# Patient Record
Sex: FEMALE | Race: BLACK OR AFRICAN AMERICAN
[De-identification: names, ages, dates, MRNs, and addresses within clinical notes are randomized per-mention and may not be internally consistent; named-entity substitution may affect disease eponyms.]

---

## 2020-08-31 ENCOUNTER — HOSPITAL ENCOUNTER (OUTPATIENT)
Dept: HOSPITAL 62 - LC | Age: 30
Discharge: HOME | End: 2020-08-31
Attending: OBSTETRICS & GYNECOLOGY
Payer: SELF-PAY

## 2020-08-31 DIAGNOSIS — O47.1: Primary | ICD-10-CM

## 2020-08-31 DIAGNOSIS — Z3A.40: ICD-10-CM

## 2020-08-31 LAB
APPEARANCE UR: (no result)
APTT PPP: YELLOW S
BARBITURATES UR QL SCN: NEGATIVE
BILIRUB UR QL STRIP: NEGATIVE
GLUCOSE UR STRIP-MCNC: NEGATIVE MG/DL
KETONES UR STRIP-MCNC: NEGATIVE MG/DL
METHADONE UR QL SCN: NEGATIVE
NITRITE UR QL STRIP: NEGATIVE
PCP UR QL SCN: NEGATIVE
PH UR STRIP: 7 [PH] (ref 5–9)
PROT UR STRIP-MCNC: 30 MG/DL
SP GR UR STRIP: 1.02
URINE AMPHETAMINES SCREEN: NEGATIVE
URINE BENZODIAZEPINES SCREEN: NEGATIVE
URINE COCAINE SCREEN: NEGATIVE
URINE MARIJUANA (THC) SCREEN: NEGATIVE
UROBILINOGEN UR-MCNC: 2 MG/DL (ref ?–2)

## 2020-08-31 PROCEDURE — 59025 FETAL NON-STRESS TEST: CPT

## 2020-08-31 PROCEDURE — 81005 URINALYSIS: CPT

## 2020-08-31 PROCEDURE — 80307 DRUG TEST PRSMV CHEM ANLYZR: CPT

## 2020-08-31 NOTE — NON STRESS TEST REPORT
=================================================================

Non Stress Test

=================================================================

Datetime Report Generated by CPN: 08/31/2020 11:18

   

   

=================================================================

DEMOGRAPHIC

=================================================================

   

EGA NST:  40.0

   

=================================================================

INDICATION

=================================================================

   

Indication for Study (NST) Other:  labor check

   

=================================================================

VITAL SIGNS

=================================================================

   

Temperature - NST:  98.5

Pulse - NST:  63

RESP - NST:  18

NBPSYS NST:  119

NBPDIA NST:  68

   

=================================================================

MONITORING

=================================================================

   

Monitor Explained:  Monitor Explained; Test Explained; Patient

   Verbalized Understanding

Time on Monitor:  08/31/2020 10:12

Time off Monitor:  08/31/2020 11:00

NST Duration:  48

   

=================================================================

NST INTERVENTIONS

=================================================================

   

NST Interventions:  PO Hydration

Physician Notified NST:  N Barreto

BABY A:  [id number]

   

=================================================================

BABY A

=================================================================

   

Fetal Movement :  Present

Contraction Frequency :  irreg

FHR Baseline :  135

Accelerations :  15X15

Decelerations :  None

Variability :  Moderate 6-25bpm

NST Review:  Meets Criteria for Reactive NST

NST Review and Verified By :  TENA Hassan RN

NST Results:  Reactive

   

=================================================================

NST REPORT

=================================================================

   

Report Trigger:  Send Report

## 2020-09-01 ENCOUNTER — HOSPITAL ENCOUNTER (INPATIENT)
Dept: HOSPITAL 62 - LC | Age: 30
LOS: 3 days | Discharge: HOME | End: 2020-09-04
Attending: OBSTETRICS & GYNECOLOGY | Admitting: OBSTETRICS & GYNECOLOGY
Payer: COMMERCIAL

## 2020-09-01 DIAGNOSIS — Z3A.40: ICD-10-CM

## 2020-09-01 LAB
ADD MANUAL DIFF: NO
APPEARANCE UR: (no result)
APTT PPP: YELLOW S
BARBITURATES UR QL SCN: NEGATIVE
BASOPHILS # BLD AUTO: 0 10^3/UL (ref 0–0.2)
BASOPHILS NFR BLD AUTO: 0.1 % (ref 0–2)
BILIRUB UR QL STRIP: NEGATIVE
EOSINOPHIL # BLD AUTO: 0 10^3/UL (ref 0–0.6)
EOSINOPHIL NFR BLD AUTO: 0.1 % (ref 0–6)
ERYTHROCYTE [DISTWIDTH] IN BLOOD BY AUTOMATED COUNT: 13.9 % (ref 11.5–14)
GLUCOSE UR STRIP-MCNC: NEGATIVE MG/DL
HCT VFR BLD CALC: 38.2 % (ref 36–47)
HGB BLD-MCNC: 12.8 G/DL (ref 12–15.5)
KETONES UR STRIP-MCNC: (no result) MG/DL
LYMPHOCYTES # BLD AUTO: 1.4 10^3/UL (ref 0.5–4.7)
LYMPHOCYTES NFR BLD AUTO: 10.1 % (ref 13–45)
MCH RBC QN AUTO: 32.1 PG (ref 27–33.4)
MCHC RBC AUTO-ENTMCNC: 33.5 G/DL (ref 32–36)
MCV RBC AUTO: 96 FL (ref 80–97)
METHADONE UR QL SCN: NEGATIVE
MONOCYTES # BLD AUTO: 0.8 10^3/UL (ref 0.1–1.4)
MONOCYTES NFR BLD AUTO: 5.8 % (ref 3–13)
NEUTROPHILS # BLD AUTO: 11.3 10^3/UL (ref 1.7–8.2)
NEUTS SEG NFR BLD AUTO: 83.9 % (ref 42–78)
NITRITE UR QL STRIP: NEGATIVE
PCP UR QL SCN: NEGATIVE
PH UR STRIP: 7 [PH] (ref 5–9)
PLATELET # BLD: 336 10^3/UL (ref 150–450)
PROT UR STRIP-MCNC: 30 MG/DL
RBC # BLD AUTO: 3.99 10^6/UL (ref 3.72–5.28)
SP GR UR STRIP: 1.02
TOTAL CELLS COUNTED % (AUTO): 100 %
URINE AMPHETAMINES SCREEN: NEGATIVE
URINE BENZODIAZEPINES SCREEN: NEGATIVE
URINE COCAINE SCREEN: NEGATIVE
URINE MARIJUANA (THC) SCREEN: NEGATIVE
UROBILINOGEN UR-MCNC: 2 MG/DL (ref ?–2)
WBC # BLD AUTO: 13.4 10^3/UL (ref 4–10.5)

## 2020-09-01 PROCEDURE — 90715 TDAP VACCINE 7 YRS/> IM: CPT

## 2020-09-01 PROCEDURE — 85027 COMPLETE CBC AUTOMATED: CPT

## 2020-09-01 PROCEDURE — 86901 BLOOD TYPING SEROLOGIC RH(D): CPT

## 2020-09-01 PROCEDURE — 80307 DRUG TEST PRSMV CHEM ANLYZR: CPT

## 2020-09-01 PROCEDURE — 86592 SYPHILIS TEST NON-TREP QUAL: CPT

## 2020-09-01 PROCEDURE — 85025 COMPLETE CBC W/AUTO DIFF WBC: CPT

## 2020-09-01 PROCEDURE — 81005 URINALYSIS: CPT

## 2020-09-01 PROCEDURE — 86850 RBC ANTIBODY SCREEN: CPT

## 2020-09-01 PROCEDURE — 86900 BLOOD TYPING SEROLOGIC ABO: CPT

## 2020-09-01 PROCEDURE — 36415 COLL VENOUS BLD VENIPUNCTURE: CPT

## 2020-09-01 PROCEDURE — 86695 HERPES SIMPLEX TYPE 1 TEST: CPT

## 2020-09-01 PROCEDURE — 84112 EVAL AMNIOTIC FLUID PROTEIN: CPT

## 2020-09-01 PROCEDURE — 86696 HERPES SIMPLEX TYPE 2 TEST: CPT

## 2020-09-01 RX ADMIN — PENICILLIN G POTASSIUM SCH MLS/HR: 5000000 POWDER, FOR SOLUTION INTRAMUSCULAR; INTRAPLEURAL; INTRATHECAL; INTRAVENOUS at 20:23

## 2020-09-01 RX ADMIN — PENICILLIN G POTASSIUM SCH MLS/HR: 5000000 POWDER, FOR SOLUTION INTRAMUSCULAR; INTRAPLEURAL; INTRATHECAL; INTRAVENOUS at 15:59

## 2020-09-01 NOTE — L&D PROGRESS NOTES
=================================================================

PROGRESS NOTES

=================================================================

Datetime Report Generated by CPN: 09/01/2020 17:02

   

   

=================================================================

PROGRESS NOTE

=================================================================

   

Impression:  Reassuring Fetal Heart Rate

Comment:  Irregular uc's, Cat 1 strip

   

=================================================================

LAST VAGINAL EXAM-NURSING

=================================================================

   

Nursing Exam Dilitation:  3.0

Nursing Exam Effacement:  100

Nursing Exam Station:  -2

Nursing Exam Contractions:  talking through contractions

   

=================================================================

FETUS A

=================================================================

   

Monitoring:  External US

Fetal Presentation:  Vertex

   

=================================================================

SIGNATURE

=================================================================

   

SIGNATURE:  [id number]

Assignment:  Mandie Antonio MD

Signature:  Electronically signed by Arianna Malave CNM on 9/1/2020 at

   17:02  with User ID: [email or web address]

:  Electronically signed by Arianna Malave CNM on 9/1/2020 at 17:02  with

   User ID: [email or web address]

## 2020-09-01 NOTE — ADMISSION PHYSICAL
=================================================================



=================================================================

Datetime Report Generated by CPN: 2020 11:40

   

   

=================================================================

CURRENT ADMISSION

=================================================================

   

Prenatal Hx Assessment:  The Prenatal History has been Reviewed and is

   Current

Chief Complaint:  Uterine Contractions

Indication for Induction:  Not Applicable

Admit Impression :  Term, Intrauterine Pregnancy; No Active Labor;

   Ruptured Membranes

Admit Plan:  Admit to Unit; Initiate Labor Augmentation Protocol

   

=================================================================

ALLERGIES

=================================================================

   

Medication Allergies:  No

Medication Allergies:  No Known Allergies (2020)

Latex:  No Latex Allergies

   

=================================================================

OBSTETRICAL HISTORY

=================================================================

   

EDC:  2020 00:00

:  1

Para:  0

Term:  0

:  0

SAB:  0

IAB:  0

Livin

Gestational Diabetes:  Yes

Rh Sensitization:  No

Incompetent Cervix:  No

RAY:  No

Infertility:  No

ART Treatment:  No

Uterine Anomaly:  No

IUGR:  No

Hx Previous C/S:  No

Macrosomia:  No

Hx Loss/Stillborn:  No

PIH:  No

Hx  Death:  No

Placenta Previa/Abruption:  No

Depression/PP Depression:  No

PTL/PROM:  No

Post Partum Hemorrhage:  No

Current Pregnancy Procedures:  Ultrasound; NST

   

=================================================================

***SEE PRENATAL RECORDS***

=================================================================

   

Alcohol:  No

Marijuana :  No

Cocaine:  No

Other Illicit Drugs:  No

Cigarettes:  Never Smoker. [id number]

   

=================================================================

MEDICAL HISTORY

=================================================================

   

Diabetes:  Yes

Diabetes Type:  Gestational Diabetes

Blood Transfusion:  No

Pulmonary Disease (Asthma, TB):  No

Breast Disease:  No

Hypertension:  Unknown

Gyn Surgery:  No

Heart Disease:  No

Hosp/Surgery:  No

Autoimmune Disorder:  No

Anesthetic Complications:  No

Kidney Disease:  No

Abnormal Pap Smear:  No

Neuro/Epilepsy:  No

Psychiatric Disorders:  No

Other Medical Diseases:  No

Hepatitis/Liver Disease:  No

Significant Family History:  No

Varicosities/Phlebitis:  No

Trauma/Violence :  Unknown

Thyroid Dysfunction:  No

   

=================================================================

INFECTIOUS HISTORY

=================================================================

   

Gonorrhea:  No

Genital Herpes:  Yes

Chlamydia:  No

Tuberculosis:  No

Syphilis:  No

Hepatitis:  No

HIV/AIDS Exposure:  No

Rash or Viral Illness:  No

HPV:  No

   

=================================================================

PHYSICAL EXAM

=================================================================

   

General:  Normal

HEENT:  Normal

Neurologic:  Normal

Thyroid:  Normal

Heart:  Normal

Lungs:  Normal

Breast:  Deferred

Back:  Normal

Abdomen:  Normal

Genitourinary Exam:  Normal

Extremities:  Normal

DTRs:  Normal

Pelvic Type:  Adequate

Physical Exam Comments:  IUP 40 weeks

   + GBS

   Cushing Diagnosis? sent to Endo

   GDM

   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

   Obesity

   

=================================================================

FETUS A

=================================================================

   

EGA:  40.1

Monitoring:  External US

Variability:  Moderate 6-25bpm

Decelerations:  None

FHR Category:  Category I

Fetal Presentation:  Vertex

Admit Comment:  Admitted to LD with complaint of UC's and leaking

   fluid, Cat 1 stip,, UC's q 2-4

   POC discussed with patient, + GBS, will start antibiotics

   

=================================================================

PLANS FOR LABOR AND DELIVERY

=================================================================

   

Labor and Delivery:  None

Pain Management:  None

Feeding Preference:  Breast

Benefit of Breast Feed Discussed:  Yes

   

=================================================================

INFORMED CONSENT

=================================================================

   

Assignment:  Mandie Antonio, MD

Signature:  Electronically signed by Arianna Malave CNM on 2020 at

   11:39  with User ID: [email or web address]

:  Electronically signed by Arianna Malave CNM on 2020 at 11:39  with

   User ID: [email or web address]

## 2020-09-02 RX ADMIN — Medication SCH CAP: at 09:53

## 2020-09-02 RX ADMIN — FERROUS SULFATE TAB 325 MG (65 MG ELEMENTAL FE) SCH MG: 325 (65 FE) TAB at 09:53

## 2020-09-02 RX ADMIN — IBUPROFEN SCH: 800 TABLET, FILM COATED ORAL at 16:03

## 2020-09-02 RX ADMIN — FAMOTIDINE SCH MG: 20 TABLET, FILM COATED ORAL at 09:53

## 2020-09-02 RX ADMIN — IBUPROFEN SCH MG: 800 TABLET, FILM COATED ORAL at 21:23

## 2020-09-02 RX ADMIN — FAMOTIDINE SCH MG: 20 TABLET, FILM COATED ORAL at 21:23

## 2020-09-02 RX ADMIN — DOCUSATE SODIUM SCH MG: 100 CAPSULE, LIQUID FILLED ORAL at 09:53

## 2020-09-02 RX ADMIN — IBUPROFEN SCH MG: 800 TABLET, FILM COATED ORAL at 14:04

## 2020-09-02 RX ADMIN — DOCUSATE SODIUM SCH MG: 100 CAPSULE, LIQUID FILLED ORAL at 17:47

## 2020-09-02 RX ADMIN — SENNOSIDES, DOCUSATE SODIUM SCH EACH: 50; 8.6 TABLET, FILM COATED ORAL at 09:53

## 2020-09-02 RX ADMIN — FERROUS SULFATE TAB 325 MG (65 MG ELEMENTAL FE) SCH MG: 325 (65 FE) TAB at 17:47

## 2020-09-02 NOTE — PDOC PROGRESS REPORT
Subjective-OB


Progress Note for:: 20


Subjective: 





29yo G1 now P1 s/p  delivery day. Pt ambulating and voiding without 

difficulty, reports pain well controlled by medication no concerns today.





Physical Exam (OB)


Vital Signs: 


                                        











Temp Pulse Resp BP Pulse Ox


 


 98.0 F   69   16   136/76 H   


 


 20 09:05  20 09:05  20 09:05  20 09:05   








                                 Intake & Output











 20





 06:59 06:59 06:59


 


Weight  111.7 kg 














- General


General Appearance: Appears well


In distress: None





- Maternal Morbidity


59. Maternal Morbidity (serious complications experinced by the mother 

associated with labor and delivery: None of the above





- Episiotomy/Laceration


Site Condition: N/A





- Lochia


Lochia Amount: Small 10-25 ml





- Abdomen


Description: Soft


Fundal Description: Firm, Midline


Fundal Height: u/u - u/2





- Respiratory


Respiratory Status: No respiratory distress





- Extremities


Upper extremity: Normal inspection


Lower extremities: Normal inspection





- Neurological


Cognition: Normal


Orientation: AAOx4





- Psychological


Associated symptoms: Normal affect, Normal mood





Objective-Diagnostic


Laboratory: 


                                        





                                 20 11:25 





                                        











  20





  09:55 11:25 11:25


 


WBC   13.4 H 


 


RBC   3.99 


 


Hgb   12.8 


 


Hct   38.2 


 


MCV   96 


 


MCH   32.1 


 


MCHC   33.5 


 


RDW   13.9 


 


Plt Count   336 


 


Seg Neutrophils %   83.9 H 


 


Urine Color  YELLOW  


 


Urine Appearance  CLOUDY  


 


Urine pH  7.0  


 


Ur Specific Gravity  1.017  


 


Urine Protein  30 H  


 


Urine Glucose (UA)  NEGATIVE  


 


Urine Ketones  TRACE H  


 


Urine Blood  LARGE H  


 


Urine Nitrite  NEGATIVE  


 


Ur Leukocyte Esterase  MODERATE H  


 


Blood Type    O POSITIVE


 


Antibody Screen    NEGATIVE














Assessment and Plan(PN)





- Assessment and Plan


(1) GBS (group B Streptococcus carrier), +RV culture, currently pregnant


Is this a current diagnosis for this admission?: Yes   


Plan: 


 delivered 








(2) Vaginal delivery


Is this a current diagnosis for this admission?: Yes   


Plan: 


routine pp care








- Time Spent with Patient


Time with patient: Less than 15 minutes


Medications reviewed and adjusted accordingly: Yes





- Disposition


Anticipated Discharge Disposition: Home, Self Care


Anticipated Discharge Timeframe: within 48 hours

## 2020-09-02 NOTE — BIRTH CERTIFICATE DATA
=================================================================

Birth Cert Data

=================================================================

Datetime Report Generated by CPN: 2020 02:40

   

   

=================================================================

BIRTH CERTIFICATE DATA

=================================================================

   

 47a. Prenatal Care:  Yes    (2020 10:00:Angela Cano RN)

 47b. Date of First Visit:  2020 00:00    (2020

   10:00:Angela Cano RN)

 47c. Date of Last Visit:  2020 00:00    (2020 10:00:Angela Cano RN)

 47d. Number of Prenatal Visits:  16    (2020 10:00:Angela Cano RN)

 48a. Number of Prev Live Births:  0    (2020 10:00:Karena Garcia RN)

 48b. Now Livin    (2020 10:00:Laure Duenas RN)

 48c. Live Births Now Dead:  0    (2020 10:00:QS system process)

 48e. Pregnancy Losses:  0    (2020 10:00:Angela Cano RN)

   

=================================================================

RISK FACTORS IN THIS PREGNANCY

=================================================================

   

 49a. Diabetes:  Yes    (2020 10:00:Angela Cano RN)

 Type of Diabetes:  Gestational Diabetes    (2020 10:00:Angela Cano RN)

 49b. Hypertension:  Unknown    (2020 10:00:Angela Cano RN)

 49c. Previous  Births:  0    (2020 10:00:Laure Duenas RN)

 49d. Stillborns:  No    (2020 10:00:Angela Cano RN)

 49d. IUGR:  No    (2020 10:00:Angela Cano RN)

 49e. Infertility Treatment:  No    (2020 10:00:Angela Cano RN)

 49f. Previous Cesareans:  0    (2020 10:00:Angela Cano RN)

   

=================================================================

Mother's Height

=================================================================

   

 50b. Height Inches:  64    (2020 11:13:QS system process)

   

=================================================================

Mother's Weight 

=================================================================

   

 51a. Pre-Pregnancy Weight (lbs):  211    (2020 10:00:Angela Cano RN)

 51b. Weight at Delivery (lbs):  246    (2020 11:13:QS system

   process)

   

=================================================================

Infections Present/Treated

=================================================================

   

 53a. Gonorrhea:  No    (2020 10:00:Angela Cano RN)

 Results this Hospital Visit :  Negative    (2020 10:00:April

   BYRON Duenas)

 53b. Syphilis:  No    (2020 10:00:Angela Cano RN)

 53c. Chlamydia:  No    (2020 10:00:Angela Cano RN)

 Results this Hospital Visit:  Negative    (2020 10:00:April

   BYRON Duenas)

 53d. Hepatitis B:  No    (2020 10:00:Angela Cano RN)

 Results this Hospital Visit:  Negative    (2020 10:00:April

   BYRON Duenas)

 53e. Hepatitis C:  Negative    (2020 10:00:Angela Cano RN)

 53h. Mother Tested for HBsAG:  Yes    (2020 10:00:Angela Cano RN)

 53i. Date Tested:  2020 00:00    (2020 10:00:Angela Cano RN)

 53j. Test Result:  Negative    (2020 10:00:Laure DuenasBYRON)

   

=================================================================

Obstetric Procedures 

=================================================================

   

 54a, b, c. Obstetric Procedures:  Ultrasound; NST    (2020

   10:00:Angela Cano RN)

   

=================================================================

Onset of Labor

=================================================================

   

 56a. PROM >12 Hrs:  17.47    (2020 11:30:QS system process)

 56b. Precipitous Labor <3 Hrs:  9    (2020 10:00:QS system

   process)

 56c. Prolonged Labor > 20 Hrs:  9    (2020 10:00:QS system

   process)

   

=================================================================



=================================================================

   

 57a. Induction of Labor:  N/A    (2020 10:00:Karena Garcia RN)

 57c. Non-Vertex Presentation A:  Vertex    (2020 10:00:Karena Garcia RN)

 57d. Steroids - Fetal Lung Mat:  None    (2020 10:00:Karena Garcia RN)

 57d. Steroids - Fetal Lung Mat:  Not Applicable    (2020

   10:00:Karena Garcia RN)

 57e. Antibiotics During Labor:  2020 20:23    (2020

   10:00:Karena Garcia RN)

 57f. Mat Chorio or Temp >100.4:  99.2    (2020 10:00:Karena Garcia RN)

 57g. Moderate/Heavy Meconium:  Clear    (2020 11:30:Angela Cano RN)

 57i. Epidural/Spinal Anesthesia:  Epidural    (2020 10:00:Karena Garcia RN)

   

=================================================================

Method of Delivery

=================================================================

   

 58a. Forceps - Unsuccessful A:  N/A    (2020 10:00:Karena Garcia RN)

 58b. Vacuum - Unsuccessful A:  N/A    (2020 10:00:Karena Garcia RN)

   

=================================================================

58c. Presentation at Birth

=================================================================

   

 58c. Presentation at Birth - A :  Vertex    (2020 10:00:Karena Garcia RN)

 58c. Presentation at Birth - A :  N/A    (2020 10:00:Karena Garcia RN)

 58c. Presentation at Birth - A :  Cephalic    (2020 22:03:Karena Garcia RN)

   

=================================================================

Final Route and Method of Del 

=================================================================

   

 58d. Baby A Route/Delivery:  Vaginal    (2020 10:00:Karena Garcia RN)

 58e. Trial of Labor Attempted:  No    (2020 10:00:Karena Garcia RN)

 58e. Trial of Labor Attempted A:  N/A    (2020 10:00:Karena Garcia RN)

 58e. Trial of Labor Attempted B:  N/A    (2020 10:00:Karena Garcia RN)

   

=================================================================

Maternal Morbidity

=================================================================

   

 59b. 3rd or 4th Degree Lacs:  None    (2020 10:00:Arianna Malave, CNM)

   

=================================================================



=================================================================

   

 Birthweight Baby A:  2920    (2020 10:00:Niya Darnell RN)

 60a. Pounds :  6    (2020 10:00:QS system process)

 60b. Ounces:  7    (2020 10:00:QS system process)

   

=================================================================

61. GA at Delivery 

=================================================================

   

 Baby A:  40.1    (2020 10:00:Karena Garcia RN)

:  Full Term- 39- 40.6 Weeks    (2020 10:00:QS system process)

   

=================================================================

62a. APGAR 5 Minute

=================================================================

   

 Baby A:  9    (2020 10:00:QS system process)

## 2020-09-02 NOTE — DELIVERY SUMMARY
=================================================================

Del Sum A-C

=================================================================

Datetime Report Generated by CPN: 2020 02:40

   

   

=================================================================

DELIVERY PERSONNEL

=================================================================

   

DELIVERY PERSONNEL:  [id number]

Delivery Doctor::  Arianna Malave CNM

Labor and Delivery Nurse::  Karena Garcia RN

Nursery Nurse::  Laure Jolly RN

   

=================================================================

MATERNAL INFORMATION

=================================================================

   

Delivery Anesthesia:  Epidural

Medications After Delivery:  Pitocin 30 Units in 500ml NS/D5W

Delivery QBL:  100

Maternal Complications:  None

Provider Comments:   viable femal from OA to JOHN PAUL over intact

   perineum, baby placed on mothers abd, cord clamped and cut after 2

   min by FOB, spont delivery of grossly normal intact placenta, 3 VC,

   cord blood to lab, FFFM, massage and Pitocin, baby and mom remain in

   recovery in stable condition, plans to breastfeed (Annotations: Data

   stored by [facility] on behalf of user)

   

=================================================================

LABOR SUMMARY

=================================================================

   

EDC:  2020 00:00

No. Babies in Womb:  1

 Attempted:  No

Labor Anesthesia:  Epidural

   

=================================================================

LABOR INFORMATION

=================================================================

   

Reason for Induction:  Not Applicable

Onset of Labor:  2020 16:00

Complete Dilatation:  2020 23:24

Oxytocin:  N/A

Group B Beta Strep:  Positive

Antibiotics # of Doses:  3

Antibiotics Time of Last Dose:  2020 20:23

Name of Antibiotic Given:  Penicilin

Steroids Given:  None

Reason Steroids Not Administered:  Not Applicable

   

=================================================================

MEMBRANES

=================================================================

   

Membranes Rupture Method:  Spontaneous

Rupture of Membranes:  2020 07:30

Length of Rupture (hr):  17.47

Amniotic Fluid Color:  Clear

Amniotic Fluid Amount:  Small

Amniotic Fluid Odor:  Normal

   

=================================================================

STAGES OF LABOR

=================================================================

   

Stage 1 hr:  7

Stage 1 min:  24

Stage 2 hr:  1

Stage 2 min:  34

Stage 3 hr:  0

Stage 3 min:  11

Total Time in Labor hr:  9

Total Time in Labor min:  9

   

=================================================================

VAGINAL DELIVERY

=================================================================

   

Episiotomy:  None

Laceration #1:  None

Laceration Extension #1:  N/A

Laceration Repair:  Not Applicable

Sponge Count Correct:  Vaginal Sweep Performed

Sharps Count Correct:  N/A

   

=================================================================

BABY A INFORMATION

=================================================================

   

Infant Delivery Date/Time:  2020 00:58

Method of Delivery:  Vaginal

Nurse Controlled Delivery:  No

Born in Route :  No

:  N/A

Forceps:  N/A

Vacuum Extraction:  N/A

Shoulder Dystocia :  No

   

=================================================================

PRESENTATION/POSITION BABY A

=================================================================

   

Presentation:  Cephalic

Cephalic Presentation:  Vertex

Vertex Position:  Left Occipital Anterior

Breech Presentation:  N/A

   

=================================================================

PLACENTA INFORMATION BABY A

=================================================================

   

Placenta Delivery Time :  2020 01:09

Placenta Method of Delivery:  Spontaneous

Placenta Status:  Delivered

   

=================================================================

APGAR SCORES BABY A

=================================================================

   

Heart Rate 1 min:  >100 bpm

Resp Effort 1 min:  Good Cry

Reflex Irritability 1 min:  Cough or Sneeze or Pulls Away

Muscle Tone 1 min:  Active Motion

Color 1 min:  Blue/Pale

Resuscitation Effort 1 min:  Tactile Stimulation

APGAR SCORE 1 MIN:  8

Heart Rate 5 min:  >100 bpm

Resp Effort 5 min:  Good Cry

Reflex Irritability 5 min:  Cough or Sneeze or Pulls Away

Muscle Tone 5 min:  Active Motion

Color 5 min:  Body Pink, Extremities Blue

Resuscitation Effort 5 min:  Tactile Stimulation

APGAR SCORE 5 MIN:  9

   

=================================================================

INFANT INFORMATION BABY A

=================================================================

   

Gestational Age at Delivery:  40.1

Gestational Status:  Full Term- 39- 40.6 Weeks

Infant Outcome :  Liveborn

Infant Condition :  Stable

Infant Sex:  Female

   

=================================================================

IDENTIFICATION BABY A

=================================================================

   

Infant Verification Date/Time:  2020 01:37

ID Band Number:  [id number]

Mother's Name Verified:  Yes

Infant Medical Record Number:  [id number]

RN Verifying Infant:  SHARA Garcia, RN/KRadha Segura, RN

   

=================================================================

WEIGHT/LENGTH BABY A

=================================================================

   

Infant Birthweight (gm):  2920

Infant Weight (lb):  6

Infant Weight (oz):  7

Infant Length (in):  19.75

Infant Length (cm):  50.17

   

=================================================================

CORD INFORMATION BABY A

=================================================================

   

No. Cord Vessels:  3

Nuchal Cord :  N/A

Cord Blood Taken:  Yes-For Eval (Mom's Blood Type - or O+)

Infant Suction:  None

   

=================================================================

ASSESSMENT BABY A

=================================================================

   

Infant Complications:  None

Physical Findings at Delivery:  Within Normal Limits

Infant Respirations:  Appears Normal

Skin to Skin:  Yes

Neonatologist/ALS Called :  No

Infant Care By:  MO Orta, RN

Transferred To:  Remains with Mother

   

=================================================================

BABY B INFORMATION

=================================================================

   

 :  N/A

## 2020-09-03 LAB
ERYTHROCYTE [DISTWIDTH] IN BLOOD BY AUTOMATED COUNT: 14.3 % (ref 11.5–14)
HCT VFR BLD CALC: 36.6 % (ref 36–47)
HGB BLD-MCNC: 12.4 G/DL (ref 12–15.5)
MCH RBC QN AUTO: 32.7 PG (ref 27–33.4)
MCHC RBC AUTO-ENTMCNC: 33.8 G/DL (ref 32–36)
MCV RBC AUTO: 97 FL (ref 80–97)
PLATELET # BLD: 335 10^3/UL (ref 150–450)
RBC # BLD AUTO: 3.79 10^6/UL (ref 3.72–5.28)
WBC # BLD AUTO: 14.3 10^3/UL (ref 4–10.5)

## 2020-09-03 RX ADMIN — FERROUS SULFATE TAB 325 MG (65 MG ELEMENTAL FE) SCH MG: 325 (65 FE) TAB at 19:43

## 2020-09-03 RX ADMIN — IBUPROFEN SCH MG: 800 TABLET, FILM COATED ORAL at 13:45

## 2020-09-03 RX ADMIN — IBUPROFEN SCH MG: 800 TABLET, FILM COATED ORAL at 21:30

## 2020-09-03 RX ADMIN — Medication SCH CAP: at 11:33

## 2020-09-03 RX ADMIN — IBUPROFEN SCH MG: 800 TABLET, FILM COATED ORAL at 05:41

## 2020-09-03 RX ADMIN — FAMOTIDINE SCH MG: 20 TABLET, FILM COATED ORAL at 11:33

## 2020-09-03 RX ADMIN — FERROUS SULFATE TAB 325 MG (65 MG ELEMENTAL FE) SCH MG: 325 (65 FE) TAB at 11:33

## 2020-09-03 RX ADMIN — SENNOSIDES, DOCUSATE SODIUM SCH EACH: 50; 8.6 TABLET, FILM COATED ORAL at 11:33

## 2020-09-03 RX ADMIN — DOCUSATE SODIUM SCH MG: 100 CAPSULE, LIQUID FILLED ORAL at 19:42

## 2020-09-03 RX ADMIN — FAMOTIDINE SCH MG: 20 TABLET, FILM COATED ORAL at 21:31

## 2020-09-03 RX ADMIN — DOCUSATE SODIUM SCH MG: 100 CAPSULE, LIQUID FILLED ORAL at 11:33

## 2020-09-03 NOTE — PDOC PROGRESS REPORT
Subjective-OB


Progress Note for:: 09/03/20





Physical Exam (OB)


Vital Signs: 


                                        











Temp Pulse Resp BP Pulse Ox


 


 97.7 F   67   16   128/84 H  100 


 


 09/03/20 07:53  09/03/20 07:53  09/03/20 07:53  09/03/20 07:53  09/02/20 20:01








                                 Intake & Output











 09/02/20 09/03/20 09/04/20





 06:59 06:59 06:59


 


Intake Total  480 


 


Balance  480 


 


Weight 111.7 kg  














- PIH/Pre-Eclampsia


Clonus: Negative


Headache: Absent


Epigastric Pain: No


Visual Changes: No





- Maternal Morbidity


59. Maternal Morbidity (serious complications experinced by the mother 

associated with labor and delivery: None of the above





- Lochia


Lochia Amount: Small 10-25 ml


Lochia Color: Rubra/Red





- Abdomen


Description: Tender, Soft, Flat


Hernia Present: No


Bowel Sounds: Normoactive


Flatus Presence: Present


Stool: No


Fundal Description: Firm, Midline


Fundal Height: u/u - u/2





Objective-Diagnostic


Laboratory: 


                                        





                                 09/03/20 08:10 





                                        











  09/03/20





  08:10


 


WBC  14.3 H


 


RBC  3.79


 


Hgb  12.4


 


Hct  36.6


 


MCV  97


 


MCH  32.7


 


MCHC  33.8


 


RDW  14.3 H


 


Plt Count  335














Assessment and Plan(PN)





- Time Spent with Patient


Time with patient: Less than 15 minutes


Medications reviewed and adjusted accordingly: Yes





- Disposition


Anticipated Discharge Disposition: Home, Self Care


Anticipated Discharge Timeframe: within 36 hours

## 2020-09-04 VITALS — DIASTOLIC BLOOD PRESSURE: 85 MMHG | SYSTOLIC BLOOD PRESSURE: 129 MMHG

## 2020-09-04 RX ADMIN — IBUPROFEN SCH MG: 800 TABLET, FILM COATED ORAL at 06:45

## 2020-09-04 RX ADMIN — FERROUS SULFATE TAB 325 MG (65 MG ELEMENTAL FE) SCH MG: 325 (65 FE) TAB at 09:52

## 2020-09-04 RX ADMIN — SENNOSIDES, DOCUSATE SODIUM SCH EACH: 50; 8.6 TABLET, FILM COATED ORAL at 09:52

## 2020-09-04 RX ADMIN — FAMOTIDINE SCH MG: 20 TABLET, FILM COATED ORAL at 09:52

## 2020-09-04 RX ADMIN — IBUPROFEN SCH: 800 TABLET, FILM COATED ORAL at 15:31

## 2020-09-04 RX ADMIN — Medication SCH CAP: at 09:52

## 2020-09-04 RX ADMIN — DOCUSATE SODIUM SCH MG: 100 CAPSULE, LIQUID FILLED ORAL at 09:52

## 2020-09-04 NOTE — PDOC DISCHARGE SUMMARY
Impression





- Admit/DC Date/PCP


Admission Date/Primary Care Provider: 


  09/01/20 11:06





  CARMEN MORATAYA MD





Discharge Date: 09/04/20 - PP day #2. doing well, O+ Rubella immune





- Discharge Diagnosis


(1) GBS (group B Streptococcus carrier), +RV culture, currently pregnant


Is this a current diagnosis for this admission?: Yes   





(2) Herpes


Is this a current diagnosis for this admission?: Yes   





(3) Pregnancy


Is this a current diagnosis for this admission?: Yes   





(4) Vaginal delivery


Is this a current diagnosis for this admission?: Yes   





- Additional Information


Resuscitation Status: Full Code


Discharge Diet: As Tolerated, Regular


Discharge Activity: Activity As Tolerated, No Lifting Over 10 Pounds, Pelvic 

Rest


Referrals: 


CARMEN MORATAYA MD [Primary Care Provider] - 


Prescriptions: 


Ibuprofen [Motrin 800 mg Tablet] 800 mg PO Q8 #60 tablet


Home Medications: 








Pnv No.95/Ferrous Fum/Folic AC [Prenatal Caplet] 1 each PO DAILY 08/31/20 


Valacyclovir HCl [Valtrex 500 mg Tablet] 500 mg PO BID 08/31/20 


Ibuprofen [Motrin 800 mg Tablet] 800 mg PO Q8 #60 tablet 09/04/20 











HPI


Reason(s) for Admission: Onset of Labor


Prenatal Procedures: Ultrasound


Intrapartum Procedure(s): Spontaneous Vaginal Delivery





Hospital Course


59. Maternal Morbidity (serious complications experinced by the mother 

associated with labor and delivery: None of the above





Results


Laboratory Results: 


                                        











WBC  14.3 10^3/uL (4.0-10.5)  H  09/03/20  08:10    


 


RBC  3.79 10^6/uL (3.72-5.28)   09/03/20  08:10    


 


Hgb  12.4 g/dL (12.0-15.5)   09/03/20  08:10    


 


Hct  36.6 % (36.0-47.0)   09/03/20  08:10    


 


MCV  97 fl (80-97)   09/03/20  08:10    


 


MCH  32.7 pg (27.0-33.4)   09/03/20  08:10    


 


MCHC  33.8 g/dL (32.0-36.0)   09/03/20  08:10    


 


RDW  14.3 % (11.5-14.0)  H  09/03/20  08:10    


 


Plt Count  335 10^3/uL (150-450)   09/03/20  08:10    


 


Lymph % (Auto)  10.1 % (13-45)  L  09/01/20  11:25    


 


Mono % (Auto)  5.8 % (3-13)   09/01/20  11:25    


 


Eos % (Auto)  0.1 % (0-6)   09/01/20  11:25    


 


Baso % (Auto)  0.1 % (0-2)   09/01/20  11:25    


 


Absolute Neuts (auto)  11.3 10^3/uL (1.7-8.2)  H  09/01/20  11:25    


 


Absolute Lymphs (auto)  1.4 10^3/uL (0.5-4.7)   09/01/20  11:25    


 


Absolute Monos (auto)  0.8 10^3/uL (0.1-1.4)   09/01/20  11:25    


 


Absolute Eos (auto)  0.0 10^3/uL (0.0-0.6)   09/01/20  11:25    


 


Absolute Basos (auto)  0.0 10^3/uL (0.0-0.2)   09/01/20  11:25    


 


Seg Neutrophils %  83.9 % (42-78)  H  09/01/20  11:25    


 


Urine Color  YELLOW   09/01/20  09:55    


 


Urine Appearance  CLOUDY   09/01/20  09:55    


 


Urine pH  7.0  (5.0-9.0)   09/01/20  09:55    


 


Ur Specific Gravity  1.017   09/01/20  09:55    


 


Urine Protein  30 mg/dL (NEGATIVE)  H  09/01/20  09:55    


 


Urine Glucose (UA)  NEGATIVE mg/dL (NEGATIVE)   09/01/20  09:55    


 


Urine Ketones  TRACE mg/dL (NEGATIVE)  H  09/01/20  09:55    


 


Urine Blood  LARGE  (NEGATIVE)  H  09/01/20  09:55    


 


Urine Nitrite  NEGATIVE  (NEGATIVE)   09/01/20  09:55    


 


Urine Bilirubin  NEGATIVE  (NEGATIVE)   09/01/20  09:55    


 


Urine Urobilinogen  2.0 mg/dL (<2.0)  H  09/01/20  09:55    


 


Ur Leukocyte Esterase  MODERATE  (NEGATIVE)  H  09/01/20  09:55    


 


Urine Ascorbic Acid  NEGATIVE  (NEGATIVE)   09/01/20  09:55    


 


Fetal Membranes Rupture  POSITIVE  (NEGATIVE)  H  09/01/20  10:36    


 


Urine Opiates Screen  NEGATIVE   09/01/20  09:55    


 


Urine Methadone Screen  NEGATIVE   09/01/20  09:55    


 


Ur Barbiturates Screen  NEGATIVE   09/01/20  09:55    


 


Ur Phencyclidine Scrn  NEGATIVE   09/01/20  09:55    


 


Ur Amphetamines Screen  NEGATIVE   09/01/20  09:55    


 


U Benzodiazepines Scrn  NEGATIVE   09/01/20  09:55    


 


Urine Cocaine Screen  NEGATIVE   09/01/20  09:55    


 


U Marijuana (THC) Screen  NEGATIVE   09/01/20  09:55    


 


RPR  NONREACTIVE  (NONREACTIVE)   09/01/20  11:25    


 


HSV I&II IgM Ab  <0.91 RATIO (0.00-0.90)   09/01/20  11:25    


 


HSV II Specific Ab  5.20 index (0.00-0.90)  H  09/01/20  11:25    


 


Blood Type  O POSITIVE   09/01/20  11:25    


 


Antibody Screen  NEGATIVE   09/01/20  11:25    














Plan


Plan of Treatment: 


d/c home, f/up with [facility] in 4 wks


Time Spent: Less than 30 Minutes